# Patient Record
Sex: FEMALE | Race: WHITE | NOT HISPANIC OR LATINO | URBAN - METROPOLITAN AREA
[De-identification: names, ages, dates, MRNs, and addresses within clinical notes are randomized per-mention and may not be internally consistent; named-entity substitution may affect disease eponyms.]

---

## 2017-09-02 ENCOUNTER — COMMUNICATION - HEALTHEAST (OUTPATIENT)
Dept: INTERNAL MEDICINE | Facility: CLINIC | Age: 32
End: 2017-09-02

## 2017-09-02 DIAGNOSIS — F41.9 ANXIETY: ICD-10-CM

## 2017-09-02 DIAGNOSIS — F32.A DEPRESSION: ICD-10-CM

## 2018-06-16 ENCOUNTER — COMMUNICATION - HEALTHEAST (OUTPATIENT)
Dept: INTERNAL MEDICINE | Facility: CLINIC | Age: 33
End: 2018-06-16

## 2018-06-16 DIAGNOSIS — F41.9 ANXIETY: ICD-10-CM

## 2018-06-16 DIAGNOSIS — F32.A DEPRESSION: ICD-10-CM

## 2019-03-25 ENCOUNTER — COMMUNICATION - HEALTHEAST (OUTPATIENT)
Dept: INTERNAL MEDICINE | Facility: CLINIC | Age: 34
End: 2019-03-25

## 2019-03-25 DIAGNOSIS — F32.A DEPRESSION: ICD-10-CM

## 2019-03-25 DIAGNOSIS — F41.9 ANXIETY: ICD-10-CM

## 2019-04-03 ENCOUNTER — COMMUNICATION - HEALTHEAST (OUTPATIENT)
Dept: INTERNAL MEDICINE | Facility: CLINIC | Age: 34
End: 2019-04-03

## 2019-12-21 ENCOUNTER — COMMUNICATION - HEALTHEAST (OUTPATIENT)
Dept: INTERNAL MEDICINE | Facility: CLINIC | Age: 34
End: 2019-12-21

## 2019-12-21 DIAGNOSIS — F41.9 ANXIETY: ICD-10-CM

## 2019-12-21 DIAGNOSIS — F32.A DEPRESSION: ICD-10-CM

## 2019-12-24 RX ORDER — BUPROPION HYDROCHLORIDE 150 MG/1
TABLET ORAL
Qty: 90 TABLET | Refills: 2 | Status: SHIPPED | OUTPATIENT
Start: 2019-12-24

## 2021-01-26 ENCOUNTER — AMBULATORY - HEALTHEAST (OUTPATIENT)
Dept: NURSING | Facility: CLINIC | Age: 36
End: 2021-01-26

## 2021-02-16 ENCOUNTER — AMBULATORY - HEALTHEAST (OUTPATIENT)
Dept: NURSING | Facility: CLINIC | Age: 36
End: 2021-02-16

## 2021-05-27 NOTE — TELEPHONE ENCOUNTER
RN cannot approve Refill Request    RN can NOT refill this medication PCP messaged that patient is overdue for Office Visit.      Elizabeth Streeter, [facility] Triage/Med Refill 3/25/2019    Requested Prescriptions   Pending Prescriptions Disp Refills     buPROPion (WELLBUTRIN XL) 150 MG 24 hr tablet [Pharmacy Med Name: BUPROPION HCL  MG TABLET] 90 tablet 2     Sig: TAKE 1 TABLET BY MOUTH EVERY DAY    Tricyclics/Misc Antidepressant/Antianxiety Meds Refill Protocol Failed - 3/25/2019  1:35 AM       Failed - PCP or prescribing provider visit in last year    Last office visit with prescriber/PCP: Visit date not found OR same dept: Visit date not found OR same specialty: Visit date not found  Last physical: 12/7/2016 Last MTM visit: Visit date not found   Next visit within 3 mo: Visit date not found  Next physical within 3 mo: Visit date not found  Prescriber OR PCP: Kimberly Levy MD  Last diagnosis associated with med order: 1. Depression  - buPROPion (WELLBUTRIN XL) 150 MG 24 hr tablet [Pharmacy Med Name: BUPROPION HCL  MG TABLET]; TAKE 1 TABLET BY MOUTH EVERY DAY  Dispense: 90 tablet; Refill: 2  - sertraline (ZOLOFT) 50 MG tablet [Pharmacy Med Name: SERTRALINE HCL 50 MG TABLET]; TAKE 1/2 TABLET BY MOUTH DAILY FOR 2 WEEKS, THEN TAKE 1 TABLET ONCE DAILY.  Dispense: 90 tablet; Refill: 2    2. Anxiety  - sertraline (ZOLOFT) 50 MG tablet [Pharmacy Med Name: SERTRALINE HCL 50 MG TABLET]; TAKE 1/2 TABLET BY MOUTH DAILY FOR 2 WEEKS, THEN TAKE 1 TABLET ONCE DAILY.  Dispense: 90 tablet; Refill: 2    If protocol passes may refill for 12 months if within 3 months of last provider visit (or a total of 15 months).             sertraline (ZOLOFT) 50 MG tablet [Pharmacy Med Name: SERTRALINE HCL 50 MG TABLET] 90 tablet 2     Sig: TAKE 1/2 TABLET BY MOUTH DAILY FOR 2 WEEKS, THEN TAKE 1 TABLET ONCE DAILY.    [facility] Refill Protocol  Failed - 3/25/2019  1:35 AM       Failed - PCP or prescribing provider visit in last year     Last office visit with prescriber/PCP: Visit date not found OR same dept: Visit date not found OR same specialty: Visit date not found  Last physical: 12/7/2016 Last MTM visit: Visit date not found   Next visit within 3 mo: Visit date not found  Next physical within 3 mo: Visit date not found  Prescriber OR PCP: Kimberly Levy MD  Last diagnosis associated with med order: 1. Depression  - buPROPion (WELLBUTRIN XL) 150 MG 24 hr tablet [Pharmacy Med Name: BUPROPION HCL  MG TABLET]; TAKE 1 TABLET BY MOUTH EVERY DAY  Dispense: 90 tablet; Refill: 2  - sertraline (ZOLOFT) 50 MG tablet [Pharmacy Med Name: SERTRALINE HCL 50 MG TABLET]; TAKE 1/2 TABLET BY MOUTH DAILY FOR 2 WEEKS, THEN TAKE 1 TABLET ONCE DAILY.  Dispense: 90 tablet; Refill: 2    2. Anxiety  - sertraline (ZOLOFT) 50 MG tablet [Pharmacy Med Name: SERTRALINE HCL 50 MG TABLET]; TAKE 1/2 TABLET BY MOUTH DAILY FOR 2 WEEKS, THEN TAKE 1 TABLET ONCE DAILY.  Dispense: 90 tablet; Refill: 2    If protocol passes may refill for 12 months if within 3 months of last provider visit (or a total of 15 months).

## 2021-05-27 NOTE — TELEPHONE ENCOUNTER
Left message for patient to call back, please ask questions below and to schedule an annual physical.    Over the last 2 weeks, how often have you been bothered by any of the following problems?      1. Little interest or pleasure in doing things    Not at all  Several days  More than half of days  Nearly every day    2. Feeling down, depressed, or hopeless    Not at all  Several days  More than half of days  Nearly every day    3. Trouble falling asleep, staying asleep, or sleeping too much    Not at all  Several days  More than half of days  Nearly every day    4. Felling tired or having little energy    Not at all  Several days  More than half of days  Nearly every day    5. Poor appetite or overeating    Not at all  Several days  More than half of days  Nearly every day    6. Feeling bad about yourself, or that you are a failure or let yourself or your family down    Not at all  Several days  More than half of days  Nearly every day    7. Trouble concentrating on things, such as reading the newspaper or watching television    Not at all  Several days  More than half of days  Nearly every day    8. Moving or speaking so slowly that other people have noticed? Or the opposite, being so fidgety or restless that you have been moving around a lot more than usual    Not at all  Several days  More than half of days  Nearly every day    9. Thoughts that you would be better off dead or hurting yourself in some way    Not at all  Several days  More than half of days  Nearly every day      If you checked off any problems, how difficult have these problems made it for you to do work, take care of things at home, or get along with other people?    Not difficult at all  Somewhat difficult   Very difficult   Extremely difficult

## 2021-06-04 NOTE — TELEPHONE ENCOUNTER
RN cannot approve Refill Request    RN can NOT refill this medication PCP messaged that patient is overdue for Office Visit. Last office visit: Visit date not found Last Physical: 12/7/2016 Last MTM visit: Visit date not found Last visit same specialty: Visit date not found.  Next visit within 3 mo: Visit date not found  Next physical within 3 mo: Visit date not found      Deepak Celeste, [facility] Triage/Med Refill 12/24/2019    Requested Prescriptions   Pending Prescriptions Disp Refills     buPROPion (WELLBUTRIN XL) 150 MG 24 hr tablet [Pharmacy Med Name: BUPROPION HCL  MG TABLET] 90 tablet 2     Sig: TAKE 1 TABLET BY MOUTH EVERY DAY       Tricyclics/Misc Antidepressant/Antianxiety Meds Refill Protocol Failed - 12/21/2019 11:03 AM        Failed - PCP or prescribing provider visit in last year     Last office visit with prescriber/PCP: Visit date not found OR same dept: Visit date not found OR same specialty: Visit date not found  Last physical: 12/7/2016 Last MTM visit: Visit date not found   Next visit within 3 mo: Visit date not found  Next physical within 3 mo: Visit date not found  Prescriber OR PCP: Kimberly Levy MD  Last diagnosis associated with med order: 1. Depression  - buPROPion (WELLBUTRIN XL) 150 MG 24 hr tablet [Pharmacy Med Name: BUPROPION HCL  MG TABLET]; TAKE 1 TABLET BY MOUTH EVERY DAY  Dispense: 90 tablet; Refill: 2  - sertraline (ZOLOFT) 50 MG tablet [Pharmacy Med Name: SERTRALINE HCL 50 MG TABLET]; TAKE 1/2 TABLET BY MOUTH DAILY FOR 2 WEEKS, THEN TAKE 1 TABLET ONCE DAILY.  Dispense: 90 tablet; Refill: 2    2. Anxiety  - sertraline (ZOLOFT) 50 MG tablet [Pharmacy Med Name: SERTRALINE HCL 50 MG TABLET]; TAKE 1/2 TABLET BY MOUTH DAILY FOR 2 WEEKS, THEN TAKE 1 TABLET ONCE DAILY.  Dispense: 90 tablet; Refill: 2    If protocol passes may refill for 12 months if within 3 months of last provider visit (or a total of 15 months).             sertraline (ZOLOFT) 50 MG tablet  [Pharmacy Med Name: SERTRALINE HCL 50 MG TABLET] 90 tablet 2     Sig: TAKE 1/2 TABLET BY MOUTH DAILY FOR 2 WEEKS, THEN TAKE 1 TABLET ONCE DAILY.       [facility] Refill Protocol  Failed - 12/21/2019 11:03 AM        Failed - PCP or prescribing provider visit in last year     Last office visit with prescriber/PCP: Visit date not found OR same dept: Visit date not found OR same specialty: Visit date not found  Last physical: 12/7/2016 Last MTM visit: Visit date not found   Next visit within 3 mo: Visit date not found  Next physical within 3 mo: Visit date not found  Prescriber OR PCP: Kimberly Levy MD  Last diagnosis associated with med order: 1. Depression  - buPROPion (WELLBUTRIN XL) 150 MG 24 hr tablet [Pharmacy Med Name: BUPROPION HCL  MG TABLET]; TAKE 1 TABLET BY MOUTH EVERY DAY  Dispense: 90 tablet; Refill: 2  - sertraline (ZOLOFT) 50 MG tablet [Pharmacy Med Name: SERTRALINE HCL 50 MG TABLET]; TAKE 1/2 TABLET BY MOUTH DAILY FOR 2 WEEKS, THEN TAKE 1 TABLET ONCE DAILY.  Dispense: 90 tablet; Refill: 2    2. Anxiety  - sertraline (ZOLOFT) 50 MG tablet [Pharmacy Med Name: SERTRALINE HCL 50 MG TABLET]; TAKE 1/2 TABLET BY MOUTH DAILY FOR 2 WEEKS, THEN TAKE 1 TABLET ONCE DAILY.  Dispense: 90 tablet; Refill: 2    If protocol passes may refill for 12 months if within 3 months of last provider visit (or a total of 15 months).

## 2021-08-15 ENCOUNTER — HEALTH MAINTENANCE LETTER (OUTPATIENT)
Age: 36
End: 2021-08-15

## 2021-10-11 ENCOUNTER — HEALTH MAINTENANCE LETTER (OUTPATIENT)
Age: 36
End: 2021-10-11

## 2022-09-25 ENCOUNTER — HEALTH MAINTENANCE LETTER (OUTPATIENT)
Age: 37
End: 2022-09-25